# Patient Record
Sex: FEMALE | Race: WHITE | NOT HISPANIC OR LATINO | Employment: FULL TIME | URBAN - METROPOLITAN AREA
[De-identification: names, ages, dates, MRNs, and addresses within clinical notes are randomized per-mention and may not be internally consistent; named-entity substitution may affect disease eponyms.]

---

## 2023-10-27 DIAGNOSIS — E66.3 OVERWEIGHT WITH BODY MASS INDEX (BMI) OF 26 TO 26.9 IN ADULT: Primary | ICD-10-CM

## 2023-11-30 RX ORDER — NALTREXONE HYDROCHLORIDE AND BUPROPION HYDROCHLORIDE 8; 90 MG/1; MG/1
2 TABLET, EXTENDED RELEASE ORAL 2 TIMES DAILY
Qty: 120 TABLET | Refills: 2 | OUTPATIENT
Start: 2023-11-30

## 2024-02-16 DIAGNOSIS — F41.9 ANXIETY DISORDER, UNSPECIFIED: ICD-10-CM

## 2024-02-16 RX ORDER — BUSPIRONE HYDROCHLORIDE 15 MG/1
15 TABLET ORAL 2 TIMES DAILY
COMMUNITY
Start: 2021-09-01

## 2024-02-16 RX ORDER — ESCITALOPRAM OXALATE 20 MG/1
20 TABLET ORAL DAILY
Qty: 30 TABLET | Refills: 0 | Status: SHIPPED | OUTPATIENT
Start: 2024-02-16 | End: 2024-03-25

## 2024-02-16 RX ORDER — ESCITALOPRAM OXALATE 20 MG/1
20 TABLET ORAL DAILY
COMMUNITY

## 2024-03-21 DIAGNOSIS — F41.9 ANXIETY DISORDER, UNSPECIFIED: ICD-10-CM

## 2024-03-21 RX ORDER — ESCITALOPRAM OXALATE 20 MG/1
20 TABLET ORAL DAILY
Qty: 90 TABLET | Refills: 1 | OUTPATIENT
Start: 2024-03-21

## 2024-03-25 DIAGNOSIS — F41.9 ANXIETY DISORDER, UNSPECIFIED: ICD-10-CM

## 2024-03-25 RX ORDER — ESCITALOPRAM OXALATE 20 MG/1
20 TABLET ORAL DAILY
Qty: 25 TABLET | Refills: 0 | Status: SHIPPED | OUTPATIENT
Start: 2024-03-25

## 2024-09-16 ENCOUNTER — LAB (OUTPATIENT)
Dept: LAB | Facility: LAB | Age: 54
End: 2024-09-16
Payer: COMMERCIAL

## 2024-09-16 ENCOUNTER — APPOINTMENT (OUTPATIENT)
Dept: PRIMARY CARE | Facility: CLINIC | Age: 54
End: 2024-09-16
Payer: COMMERCIAL

## 2024-09-16 VITALS
HEART RATE: 83 BPM | WEIGHT: 145.69 LBS | HEIGHT: 64 IN | DIASTOLIC BLOOD PRESSURE: 71 MMHG | TEMPERATURE: 98 F | BODY MASS INDEX: 24.87 KG/M2 | SYSTOLIC BLOOD PRESSURE: 108 MMHG

## 2024-09-16 DIAGNOSIS — Z13.1 SCREENING FOR DIABETES MELLITUS: ICD-10-CM

## 2024-09-16 DIAGNOSIS — Z13.220 LIPID SCREENING: ICD-10-CM

## 2024-09-16 DIAGNOSIS — Z00.00 ROUTINE GENERAL MEDICAL EXAMINATION AT A HEALTH CARE FACILITY: Primary | ICD-10-CM

## 2024-09-16 DIAGNOSIS — Z13.29 SCREENING FOR THYROID DISORDER: ICD-10-CM

## 2024-09-16 DIAGNOSIS — F41.9 ANXIETY DISORDER, UNSPECIFIED: ICD-10-CM

## 2024-09-16 DIAGNOSIS — E55.9 VITAMIN D DEFICIENCY: ICD-10-CM

## 2024-09-16 DIAGNOSIS — Z12.31 SCREENING MAMMOGRAM FOR BREAST CANCER: ICD-10-CM

## 2024-09-16 LAB
25(OH)D3 SERPL-MCNC: 28 NG/ML (ref 30–100)
ALBUMIN SERPL BCP-MCNC: 4.7 G/DL (ref 3.4–5)
ALP SERPL-CCNC: 40 U/L (ref 33–110)
ALT SERPL W P-5'-P-CCNC: 12 U/L (ref 7–45)
ANION GAP SERPL CALC-SCNC: 10 MMOL/L (ref 10–20)
AST SERPL W P-5'-P-CCNC: 17 U/L (ref 9–39)
BILIRUB SERPL-MCNC: 0.7 MG/DL (ref 0–1.2)
BUN SERPL-MCNC: 13 MG/DL (ref 6–23)
CALCIUM SERPL-MCNC: 9.7 MG/DL (ref 8.6–10.3)
CHLORIDE SERPL-SCNC: 105 MMOL/L (ref 98–107)
CHOLEST SERPL-MCNC: 256 MG/DL (ref 0–199)
CHOLESTEROL/HDL RATIO: 2.8
CO2 SERPL-SCNC: 28 MMOL/L (ref 21–32)
CREAT SERPL-MCNC: 0.73 MG/DL (ref 0.5–1.05)
EGFRCR SERPLBLD CKD-EPI 2021: >90 ML/MIN/1.73M*2
GLUCOSE SERPL-MCNC: 97 MG/DL (ref 74–99)
HDLC SERPL-MCNC: 89.9 MG/DL
LDLC SERPL CALC-MCNC: 152 MG/DL
NON HDL CHOLESTEROL: 166 MG/DL (ref 0–149)
POTASSIUM SERPL-SCNC: 5.4 MMOL/L (ref 3.5–5.3)
PROT SERPL-MCNC: 7.3 G/DL (ref 6.4–8.2)
SODIUM SERPL-SCNC: 138 MMOL/L (ref 136–145)
TRIGL SERPL-MCNC: 70 MG/DL (ref 0–149)
TSH SERPL-ACNC: 1.26 MIU/L (ref 0.44–3.98)
VLDL: 14 MG/DL (ref 0–40)

## 2024-09-16 PROCEDURE — 90471 IMMUNIZATION ADMIN: CPT | Performed by: FAMILY MEDICINE

## 2024-09-16 PROCEDURE — 84443 ASSAY THYROID STIM HORMONE: CPT

## 2024-09-16 PROCEDURE — 3008F BODY MASS INDEX DOCD: CPT | Performed by: FAMILY MEDICINE

## 2024-09-16 PROCEDURE — 80053 COMPREHEN METABOLIC PANEL: CPT

## 2024-09-16 PROCEDURE — 1036F TOBACCO NON-USER: CPT | Performed by: FAMILY MEDICINE

## 2024-09-16 PROCEDURE — 99396 PREV VISIT EST AGE 40-64: CPT | Performed by: FAMILY MEDICINE

## 2024-09-16 PROCEDURE — 36415 COLL VENOUS BLD VENIPUNCTURE: CPT

## 2024-09-16 PROCEDURE — 82306 VITAMIN D 25 HYDROXY: CPT

## 2024-09-16 PROCEDURE — 80061 LIPID PANEL: CPT

## 2024-09-16 PROCEDURE — 90750 HZV VACC RECOMBINANT IM: CPT | Performed by: FAMILY MEDICINE

## 2024-09-16 RX ORDER — BUPROPION HYDROCHLORIDE 150 MG/1
TABLET ORAL
COMMUNITY
End: 2024-09-16 | Stop reason: WASHOUT

## 2024-09-16 RX ORDER — ESCITALOPRAM OXALATE 20 MG/1
20 TABLET ORAL DAILY
Qty: 90 TABLET | Refills: 3 | Status: SHIPPED | OUTPATIENT
Start: 2024-09-16 | End: 2025-09-16

## 2024-09-16 NOTE — PROGRESS NOTES
"    /71   Pulse 83   Temp 36.7 °C (98 °F)   Ht 1.619 m (5' 3.75\")   Wt 66.1 kg (145 lb 11 oz)   BMI 25.20 kg/m²     Past Medical History:   Diagnosis Date    Encounter for examination of ears and hearing without abnormal findings 01/27/2017    Encounter for audiology evaluation    Encounter for gynecological examination (general) (routine) without abnormal findings 11/03/2020    Well woman exam    Other specified health status     No pertinent past medical history    Other specified noninflammatory disorders of vagina 11/03/2020    Vaginal odor    Personal history of other diseases of the respiratory system 11/02/2021    History of acute pharyngitis    Pulsatile tinnitus, left ear 07/18/2017    Pulsatile tinnitus of left ear    Tinnitus, left ear 01/27/2017    Subjective tinnitus of left ear       There is no problem list on file for this patient.      Current Outpatient Medications   Medication Sig Dispense Refill    escitalopram (Lexapro) 20 mg tablet TAKE 1 TABLET BY MOUTH EVERY DAY 25 tablet 0    buPROPion XL (Wellbutrin XL) 150 mg 24 hr tablet       busPIRone (Buspar) 15 mg tablet Take 1 tablet (15 mg) by mouth 2 times a day.      escitalopram (Lexapro) 20 mg tablet Take 1 tablet (20 mg) by mouth once daily.       No current facility-administered medications for this visit.       CC/HPI/ASSESSMENT/PLAN    CC annual wellness visit    HPI patient 54-year-old female here for a wellness visit.  No cognitive deficits noted.  She is up-to-date with colonoscopy.  Immunizations reviewed, declines flu shots.  She will need second shingle vaccine otherwise up-to-date with vaccines.  She is due for mammography and blood work as well.  Currently using Lexapro with good results.  Requesting refills.  Patient denies headache fever chest pain palpitation short of breath abdominal pain diarrhea blood in urine or stool.  ROS negative except noted above.  Past medical social surgical history is reviewed    Exam: Vital " stable eyes no jaundice ears clear mouth moist throat clear neck supple no LAD no goiter no carotid bruit.  Lungs CTA good air exchange CV RRR abdomen soft nontender back straight no scoliosis Ext full ROM all extremities no edema or cyanosis.  Skin no rash neuro alert oriented no focal neurologic deficits noted no cognitive deficits noted.  Psych: Pleasant female no psychosis    A/P 1.  Annual wellness visit.  No cognitive deficits noted.  Colonoscopy up-to-date.  Declines flu shot, received second shingle vaccine otherwise up-to-date with vaccines.  Mammogram ordered.  Blood work is ordered.  Lexapro refilled.  Healthy diet regular exercise discussed.  Follow-up 1 year or sooner as needed    There are no diagnoses linked to this encounter.

## 2024-09-16 NOTE — RESULT ENCOUNTER NOTE
Blood work reviewed.  Vitamin D slightly low add 2000 IU vitamin D3 daily.  Liver kidney sugar levels look good.  Cholesterol increased significantly now to 56.  Watch the diet.  No medicines advised at this time.

## 2024-09-23 ENCOUNTER — TELEPHONE (OUTPATIENT)
Dept: PRIMARY CARE | Facility: CLINIC | Age: 54
End: 2024-09-23
Payer: COMMERCIAL

## 2024-09-23 NOTE — TELEPHONE ENCOUNTER
----- Message from Santiago Hidalgo sent at 9/16/2024  3:09 PM EDT -----  Blood work reviewed.  Vitamin D slightly low add 2000 IU vitamin D3 daily.  Liver kidney sugar levels look good.  Cholesterol increased significantly now to 56.  Watch the diet.  No medicines advised at this time.

## 2024-10-17 DIAGNOSIS — F41.9 ANXIETY DISORDER, UNSPECIFIED: ICD-10-CM

## 2024-10-17 RX ORDER — ESCITALOPRAM OXALATE 20 MG/1
20 TABLET ORAL DAILY
Qty: 90 TABLET | Refills: 3 | Status: SHIPPED | OUTPATIENT
Start: 2024-10-17 | End: 2025-10-17

## 2024-10-30 ENCOUNTER — HOSPITAL ENCOUNTER (OUTPATIENT)
Dept: RADIOLOGY | Facility: HOSPITAL | Age: 54
Discharge: HOME | End: 2024-10-30
Payer: COMMERCIAL

## 2024-10-30 DIAGNOSIS — Z12.31 SCREENING MAMMOGRAM FOR BREAST CANCER: ICD-10-CM

## 2024-10-30 PROCEDURE — 77063 BREAST TOMOSYNTHESIS BI: CPT | Performed by: RADIOLOGY

## 2024-10-30 PROCEDURE — 77067 SCR MAMMO BI INCL CAD: CPT

## 2024-10-30 PROCEDURE — 77067 SCR MAMMO BI INCL CAD: CPT | Performed by: RADIOLOGY

## 2024-11-06 ENCOUNTER — TELEPHONE (OUTPATIENT)
Dept: PRIMARY CARE | Facility: CLINIC | Age: 54
End: 2024-11-06
Payer: COMMERCIAL

## 2024-11-06 DIAGNOSIS — R92.8 ABNORMAL MAMMOGRAM: Primary | ICD-10-CM

## 2024-11-06 NOTE — TELEPHONE ENCOUNTER
----- Message from Santiago Hidalgo sent at 11/6/2024 12:26 PM EST -----  Radiology recommending diagnostic mammogram and ultrasound left breast as ordered

## 2024-12-09 ENCOUNTER — HOSPITAL ENCOUNTER (OUTPATIENT)
Dept: RADIOLOGY | Facility: HOSPITAL | Age: 54
Discharge: HOME | End: 2024-12-09
Payer: COMMERCIAL

## 2024-12-09 DIAGNOSIS — R92.8 ABNORMAL MAMMOGRAM: ICD-10-CM

## 2024-12-09 PROCEDURE — 77065 DX MAMMO INCL CAD UNI: CPT | Mod: LT

## 2024-12-09 PROCEDURE — 76642 ULTRASOUND BREAST LIMITED: CPT | Mod: LEFT SIDE | Performed by: RADIOLOGY

## 2024-12-09 PROCEDURE — 77061 BREAST TOMOSYNTHESIS UNI: CPT | Mod: LEFT SIDE | Performed by: RADIOLOGY

## 2024-12-09 PROCEDURE — 76982 USE 1ST TARGET LESION: CPT | Mod: LT

## 2024-12-09 PROCEDURE — 77065 DX MAMMO INCL CAD UNI: CPT | Mod: LEFT SIDE | Performed by: RADIOLOGY

## 2024-12-09 PROCEDURE — 76642 ULTRASOUND BREAST LIMITED: CPT | Mod: LT

## 2025-01-03 ENCOUNTER — APPOINTMENT (OUTPATIENT)
Dept: SURGICAL ONCOLOGY | Facility: HOSPITAL | Age: 55
End: 2025-01-03
Payer: COMMERCIAL

## 2025-01-03 ENCOUNTER — APPOINTMENT (OUTPATIENT)
Dept: RADIOLOGY | Facility: HOSPITAL | Age: 55
End: 2025-01-03
Payer: COMMERCIAL

## 2025-01-09 ENCOUNTER — APPOINTMENT (OUTPATIENT)
Dept: SURGICAL ONCOLOGY | Facility: HOSPITAL | Age: 55
End: 2025-01-09
Payer: COMMERCIAL

## 2025-05-01 ENCOUNTER — OFFICE VISIT (OUTPATIENT)
Dept: ORTHOPEDIC SURGERY | Facility: CLINIC | Age: 55
End: 2025-05-01
Payer: COMMERCIAL

## 2025-05-01 ENCOUNTER — HOSPITAL ENCOUNTER (OUTPATIENT)
Dept: RADIOLOGY | Facility: CLINIC | Age: 55
Discharge: HOME | End: 2025-05-01
Payer: COMMERCIAL

## 2025-05-01 DIAGNOSIS — M25.511 ACUTE PAIN OF RIGHT SHOULDER: ICD-10-CM

## 2025-05-01 DIAGNOSIS — M75.01 ADHESIVE CAPSULITIS OF RIGHT SHOULDER: Primary | ICD-10-CM

## 2025-05-01 PROCEDURE — 1036F TOBACCO NON-USER: CPT | Performed by: ORTHOPAEDIC SURGERY

## 2025-05-01 PROCEDURE — 20610 DRAIN/INJ JOINT/BURSA W/O US: CPT | Performed by: ORTHOPAEDIC SURGERY

## 2025-05-01 PROCEDURE — 99204 OFFICE O/P NEW MOD 45 MIN: CPT | Performed by: ORTHOPAEDIC SURGERY

## 2025-05-01 PROCEDURE — 73030 X-RAY EXAM OF SHOULDER: CPT | Mod: RT

## 2025-05-01 RX ORDER — LIDOCAINE HYDROCHLORIDE 10 MG/ML
2 INJECTION, SOLUTION INFILTRATION; PERINEURAL
Status: COMPLETED | OUTPATIENT
Start: 2025-05-01 | End: 2025-05-01

## 2025-05-01 RX ORDER — TRIAMCINOLONE ACETONIDE 40 MG/ML
40 INJECTION, SUSPENSION INTRA-ARTICULAR; INTRAMUSCULAR
Status: COMPLETED | OUTPATIENT
Start: 2025-05-01 | End: 2025-05-01

## 2025-05-01 RX ADMIN — TRIAMCINOLONE ACETONIDE 40 MG: 40 INJECTION, SUSPENSION INTRA-ARTICULAR; INTRAMUSCULAR at 14:12

## 2025-05-01 RX ADMIN — LIDOCAINE HYDROCHLORIDE 2 ML: 10 INJECTION, SOLUTION INFILTRATION; PERINEURAL at 14:12

## 2025-05-01 NOTE — PROGRESS NOTES
History of Present Illness:   Patient presents today with right shoulder pain.  The  patient notes the insidious onset of pain and denies recent or historical trauma.  There is persistent pain and difficulty with motion.  The pain is diffuse, achy, worse at the extremes of motion.  There is pain at night.  No active cervical spine issues, numbness or tingling.     She is very physically active outside of work, has a desk job.  She was seeing a physical therapist for the shoulder pain that had referred her to Dr. Miller and team.  Denies any numbness, tingling, loss sensation to the right upper extremity.    Medical History[1]  Surgical History[2]  Current Medications[3]    Review of Systems   GENERAL: Negative for malaise, significant weight loss, fever  MUSCULOSKELETAL: see HPI  NEURO:  Negative    Physical Examination:  Skin healthy and intact  Negative Spurlings test  Range of motion:  Forward flexion: Full  External rotation: 90  Internal rotation: Lumbar spine, contralateral side T-spine  No gross weakness with strength testing   Normal nuerovascular exam distally     Imaging:  No obvious fractures or significant degenerative changes noted    Assessment:  Patient with adhesive capsulitis     Plan:  We had a lengthy discussion regarding adhesive capsulitis including the causes (idiopathic vs secondary in nature).  We stressed the importance of extensive physical therapy and strict adherence to a home exercise program.  We highlighted the excellent probability for success with non-surgical treatment but the protracted course of recovery.   We reviewed the role of anti-inflammatories (oral and injectable) and the potential for manipulation and/or arthroscopy if failure to improve.    Elvin Gil PA-C      In a face to face encounter, I evaluated the patient and performed a physical examination, discussed pertinent diagnostic studies if indicated and discussed diagnosis and management strategies with both the  patient and physician assistant / nurse practitioner.  I reviewed the PA/NP's note and agree with the documented findings and plan of care.    Patient with evidence of a mild adhesive capsulitis.  We discussed this may be primary or secondary.  Discussed formal therapy and corticosteroid injection.    Does have positive impingement test as well as speeds test, MRI if she fails to improve    L Inj/Asp: R subacromial bursa on 5/1/2025 2:12 PM  Indications: pain  Details: 22 G needle, posterior approach  Medications: 2 mL lidocaine 10 mg/mL (1 %); 40 mg triamcinolone acetonide 40 mg/mL  Outcome: tolerated well, no immediate complications  Procedure, treatment alternatives, risks and benefits explained, specific risks discussed. Consent was given by the patient. Immediately prior to procedure a time out was called to verify the correct patient, procedure, equipment, support staff and site/side marked as required. Patient was prepped and draped in the usual sterile fashion.             Chad Miller MD                   [1]   Past Medical History:  Diagnosis Date    Encounter for examination of ears and hearing without abnormal findings 01/27/2017    Encounter for audiology evaluation    Encounter for gynecological examination (general) (routine) without abnormal findings 11/03/2020    Well woman exam    Other specified health status     No pertinent past medical history    Other specified noninflammatory disorders of vagina 11/03/2020    Vaginal odor    Personal history of other diseases of the respiratory system 11/02/2021    History of acute pharyngitis    Pulsatile tinnitus, left ear 07/18/2017    Pulsatile tinnitus of left ear    Tinnitus, left ear 01/27/2017    Subjective tinnitus of left ear   [2]   Past Surgical History:  Procedure Laterality Date    BREAST LUMPECTOMY  12/23/2016    Breast Surgery Lumpectomy    MR HEAD ANGIO WO IV CONTRAST  7/31/2017    MR HEAD ANGIO WO IV CONTRAST 7/31/2017 [facility] CLINICAL LEGACY     MR NECK ANGIO WO IV CONTRAST  7/31/2017    MR NECK ANGIO WO IV CONTRAST 7/31/2017 [facility] CLINICAL LEGACY   [3]   Current Outpatient Medications:     escitalopram (Lexapro) 20 mg tablet, Take 1 tablet (20 mg) by mouth once daily., Disp: 90 tablet, Rfl: 3

## 2025-05-27 ENCOUNTER — OFFICE VISIT (OUTPATIENT)
Dept: URGENT CARE | Age: 55
End: 2025-05-27
Payer: COMMERCIAL

## 2025-05-27 VITALS
TEMPERATURE: 97.5 F | RESPIRATION RATE: 16 BRPM | SYSTOLIC BLOOD PRESSURE: 135 MMHG | HEART RATE: 61 BPM | OXYGEN SATURATION: 99 % | DIASTOLIC BLOOD PRESSURE: 77 MMHG

## 2025-05-27 DIAGNOSIS — R09.81 NASAL CONGESTION: ICD-10-CM

## 2025-05-27 DIAGNOSIS — R49.1 LOSS OF VOICE: ICD-10-CM

## 2025-05-27 DIAGNOSIS — U07.1 COVID-19: Primary | ICD-10-CM

## 2025-05-27 LAB
POC CORONAVIRUS SARS-COV-2 PCR: POSITIVE
POC HUMAN RHINOVIRUS PCR: NEGATIVE
POC INFLUENZA A VIRUS PCR: NEGATIVE
POC INFLUENZA B VIRUS PCR: NEGATIVE
POC RESPIRATORY SYNCYTIAL VIRUS PCR: NEGATIVE

## 2025-05-27 PROCEDURE — 99203 OFFICE O/P NEW LOW 30 MIN: CPT | Performed by: STUDENT IN AN ORGANIZED HEALTH CARE EDUCATION/TRAINING PROGRAM

## 2025-05-27 PROCEDURE — 87631 RESP VIRUS 3-5 TARGETS: CPT | Performed by: STUDENT IN AN ORGANIZED HEALTH CARE EDUCATION/TRAINING PROGRAM

## 2025-05-27 NOTE — PROGRESS NOTES
Subjective   Patient ID: Anastacia Fonseca is a 55 y.o. female. They present today with a chief complaint of Nasal Congestion, Laryngitis, and dry throat (4 days).    History of Present Illness  Anastacia is a very pleasant 55-year-old female who presents to the urgent care for evaluation of 4 days of sore throat with now losing voice and some nasal congestion.  Patient notes a slight cough with throat irritation.  Patient denies chest pain, shortness of breath or leg swelling or leg pain.  Patient is seeking evaluation reassurance that she has been around family recently who are elderly and have other chronic medical conditions and would like evaluation, testing and reassurance.  No other symptoms or concerns otherwise reported.    Past Medical History  Allergies as of 05/27/2025    (No Known Allergies)       Prescriptions Prior to Admission[1]     Medical History[2]    Surgical History[3]     reports that she has never smoked. She has never used smokeless tobacco.    Review of Systems  A 10-point review of systems was performed, otherwise unremarkable unless stated in the history of present illness.              Objective    Vitals:    05/27/25 1642   BP: 135/77   Pulse: 61   Resp: 16   Temp: 36.4 °C (97.5 °F)   SpO2: 99%     No LMP recorded. Patient is postmenopausal.    Gen: Vitals noted and reviewed, no evidence of acute distress, well developed and afebrile.   Psych: Mood and affect appropriate for setting.  Head/Face: Atraumatic and normocephalic.   Neuro: No focal deficits noted.  ENT: TMs clear bilaterally, EACs unremarkable. Mastoids non-tender. Posterior oropharynx without erythema, exudate, or swelling. Uvula is in the midline and non-edematous.   Neck: Supple. No meningismus through full range of motion. No lymphadenopathy.   Cardiac: Regular rate and rhythm no murmur.   Lungs: Clear to auscultation throughout, No evidence of wheezing, rhonchi or crackles. Good aeration throughout.   Extremities: Symmetrical, No  peripheral edema  Skin: Without evidence of ecchymosis, wounds, or rashes.      Point of Care Test & Imaging Results from this visit  Results for orders placed or performed in visit on 05/27/25   POCT SPOTFIRE R/ST Panel Mini w/COVID ([facility]) manually resulted    Specimen: Swab   Result Value Ref Range    POC Sars-Cov-2 PCR Positive (A) Negative    POC Respiratory Syncytial Virus PCR Negative Negative    POC Influenza A Virus PCR Negative Negative    POC Influenza B Virus PCR Negative Negative    POC Human Rhinovirus PCR Negative Negative      Imaging  No results found.    Cardiology, Vascular, and Other Imaging  No other imaging results found for the past 2 days      Diagnostic study results (if any) were reviewed by Kesha Torres DO.    Assessment/Plan   Allergies, medications, history, and pertinent labs/EKGs/Imaging reviewed by Kesha Torres DO.     Medical Decision Making  Discussed with the patient symptoms and clinical presentation findings suggestive of an acute viral upper respiratory illness likely secondary COVID-19 infection with possible developing laryngitis secondary to this.  We advise close symptom monitoring supportive treatment use of over-the-counter remedies to aid in symptom management.  We reviewed contact precautions and recommended supportive treatment measures and reviewed red flag symptoms of COVID-19 infection and advised seek immediate emergent medical attention if these develop.  In the meantime recommended supportive care and rest and we offered a work excuse note which the patient deferred at this time. Follow up with Primary Care Provider. We advised seeking immediate emergency medical attention if symptoms fail to improve, worsen or any concerning symptoms arise. Patient voiced full understanding and agreement to plan.      Orders and Diagnoses  Diagnoses and all orders for this visit:  COVID-19  Nasal congestion  -     POCT SPOTFIRE R/ST Panel Mini w/COVID ([facility]) manually  resulted  Loss of voice  -     POCT SPOTFIRE R/ST Panel Mini w/COVID ([facility]) manually resulted      Medical Admin Record      Patient disposition: Home    Electronically signed by Kesha Torres DO  5:11 PM           [1] (Not in a hospital admission)   [2]   Past Medical History:  Diagnosis Date    Encounter for examination of ears and hearing without abnormal findings 01/27/2017    Encounter for audiology evaluation    Encounter for gynecological examination (general) (routine) without abnormal findings 11/03/2020    Well woman exam    Other specified health status     No pertinent past medical history    Other specified noninflammatory disorders of vagina 11/03/2020    Vaginal odor    Personal history of other diseases of the respiratory system 11/02/2021    History of acute pharyngitis    Pulsatile tinnitus, left ear 07/18/2017    Pulsatile tinnitus of left ear    Tinnitus, left ear 01/27/2017    Subjective tinnitus of left ear   [3]   Past Surgical History:  Procedure Laterality Date    BREAST LUMPECTOMY  12/23/2016    Breast Surgery Lumpectomy    MR HEAD ANGIO WO IV CONTRAST  7/31/2017    MR HEAD ANGIO WO IV CONTRAST 7/31/2017 [facility] CLINICAL LEGACY    MR NECK ANGIO WO IV CONTRAST  7/31/2017    MR NECK ANGIO WO IV CONTRAST 7/31/2017 [facility] CLINICAL LEGACY

## 2025-06-11 ENCOUNTER — OFFICE VISIT (OUTPATIENT)
Dept: URGENT CARE | Age: 55
End: 2025-06-11
Payer: COMMERCIAL

## 2025-06-11 VITALS
OXYGEN SATURATION: 98 % | HEART RATE: 62 BPM | WEIGHT: 145 LBS | RESPIRATION RATE: 18 BRPM | BODY MASS INDEX: 25.08 KG/M2 | TEMPERATURE: 97.6 F | DIASTOLIC BLOOD PRESSURE: 77 MMHG | SYSTOLIC BLOOD PRESSURE: 150 MMHG

## 2025-06-11 DIAGNOSIS — U07.1 COVID-19: Primary | ICD-10-CM

## 2025-06-11 DIAGNOSIS — J04.0 LARYNGITIS: ICD-10-CM

## 2025-06-11 DIAGNOSIS — R68.89 FLU-LIKE SYMPTOMS: ICD-10-CM

## 2025-06-11 PROCEDURE — 87631 RESP VIRUS 3-5 TARGETS: CPT | Performed by: STUDENT IN AN ORGANIZED HEALTH CARE EDUCATION/TRAINING PROGRAM

## 2025-06-11 PROCEDURE — 99213 OFFICE O/P EST LOW 20 MIN: CPT | Performed by: STUDENT IN AN ORGANIZED HEALTH CARE EDUCATION/TRAINING PROGRAM

## 2025-06-11 RX ORDER — PREDNISONE 20 MG/1
20 TABLET ORAL DAILY
Qty: 5 TABLET | Refills: 0 | Status: SHIPPED | OUTPATIENT
Start: 2025-06-11 | End: 2025-06-16

## 2025-06-11 NOTE — PATIENT INSTRUCTIONS
Follow up with Primary Care Provider. We advised seeking immediate emergency medical attention if symptoms fail to improve, worsen or any concerning symptoms arise. Patient voiced full understanding and agreement to plan.    We discussed with the patient our clinical thoughts at this time given the above findings and clinical assessment and we had a shared decision-making conversation in a patient-centered decision-making model on how to proceed forward. The patient was instructed on the importance of a close follow-up with Primary Care Provider and other care providers. The patient was also advised that an Urgent care diagnosis is often a preliminary impression and that definitive care is often not able to be given completley in the Urgent care setting.

## 2025-06-11 NOTE — PROGRESS NOTES
Subjective   Patient ID: Anastacia Fonseca is a 55 y.o. female. They present today with a chief complaint of Nasal Congestion and Cough.    History of Present Illness  Anastacia is a pleasant established 55-year-old female who presents to the urgent care for follow-up of upper respiratory symptoms with previous diagnosis of COVID-19 infection on 5/27/2025.  The patient states [place] [facility] and started feeling worse shortly after without associated chest pain or shortness of breath.  Patient's main concern is losing voice and is seeking evaluation reassurance and treatment options.  Patient is also requesting additional testing to rule out other etiologies of symptoms as she has an advance high risk aged parent who she has to  this week and will like to limit exposure if needed.  Patient denies any leg or calf pain or calf swelling.  No other symptoms or concerns otherwise reported.    Past Medical History  Allergies as of 06/11/2025    (No Known Allergies)       Prescriptions Prior to Admission[1]     Medical History[2]    Surgical History[3]     reports that she has never smoked. She has never used smokeless tobacco.    Review of Systems  A 10-point review of systems was performed, otherwise unremarkable unless stated in the history of present illness.              Objective    Vitals:    06/11/25 1616   BP: 150/77   Pulse: 62   Resp: 18   Temp: 36.4 °C (97.6 °F)   SpO2: 98%   Weight: 65.8 kg (145 lb)     No LMP recorded. Patient is postmenopausal.    Gen: Vitals noted and reviewed, no evidence of acute distress, well developed and afebrile.   Psych: Mood and affect appropriate for setting.  Head/Face: Atraumatic and normocephalic.   Neuro: No focal deficits noted.  ENT: TMs clear bilaterally, EACs unremarkable. Mastoids non-tender. Posterior oropharynx without erythema, exudate, or swelling. Uvula is in the midline and non-edematous.   Neck: Supple. No meningismus through full range of motion. No lymphadenopathy.    Cardiac: Regular rate and rhythm no murmur.   Lungs: Clear to auscultation throughout, No evidence of wheezing, rhonchi or crackles. Good aeration throughout.   Extremities: Symmetrical, No peripheral edema. Negative homans sign bilaterally   Skin: Without evidence of ecchymosis, wounds, or rashes.      Point of Care Test & Imaging Results from this visit  Results for orders placed or performed in visit on 06/11/25   POCT SPOTFIRE R/ST [email or web address] manually resulted    Specimen: Swab   Result Value Ref Range    POC Sars-Cov-2 PCR Positive (A) Negative    POC Respiratory Syncytial Virus PCR Negative Negative    POC Influenza A Virus PCR Negative Negative    POC Influenza B Virus PCR Negative Negative    POC Human Rhinovirus PCR Negative Negative      Imaging  No results found.    Cardiology, Vascular, and Other Imaging  No other imaging results found for the past 2 days      Diagnostic study results (if any) were reviewed by Kesha Torres DO.    Assessment/Plan   Allergies, medications, history, and pertinent labs/EKGs/Imaging reviewed by Kesha Torres DO.     Medical Decision Making  Discussed with the patient symptoms and clinical presentation findings suggestive of likely acute laryngitis in the setting of active COVID 19 infection as detected on PCR testing with previous diagnosis of COVID-19 2 weeks ago.  We advise close symptom monitoring supportive treatment use of over-the-counter remedies to aid in symptom management.  In the meantime we agreed to prescribe a short course of prednisone to aid in symptom relief and laryngitis symptoms. We reviewed contact precautions and recommended supportive treatment measures and reviewed red flag symptoms of COVID-19 infection and advised seek immediate emergent medical attention if these develop.  In the meantime recommended supportive care and rest and we offered a work excuse note which the patient deferred at this time. Follow up with Primary Care  Provider. We advised seeking immediate emergency medical attention if symptoms fail to improve, worsen or any concerning symptoms arise. Patient voiced full understanding and agreement to plan.     We discussed with the patient our clinical thoughts at this time given the above findings and clinical assessment and we had a shared decision-making conversation in a patient-centered decision-making model on how to proceed forward. The patient was instructed on the importance of a close follow-up with Primary Care Provider and other care providers. The patient was also advised that an Urgent care diagnosis is often a preliminary impression and that definitive care is often not able to be given completley in the Urgent care setting.      Orders and Diagnoses  Diagnoses and all orders for this visit:  COVID-19  Flu-like symptoms  -     POCT SPOTFIRE R/ST Panel Mini w/COVID ([facility]) manually resulted  Laryngitis  -     predniSONE (Deltasone) 20 mg tablet; Take 1 tablet (20 mg) by mouth once daily for 5 days.      Medical Admin Record      Patient disposition: Home    Electronically signed by Kesha Torres DO  5:39 PM           [1] (Not in a hospital admission)   [2]   Past Medical History:  Diagnosis Date    Encounter for examination of ears and hearing without abnormal findings 01/27/2017    Encounter for audiology evaluation    Encounter for gynecological examination (general) (routine) without abnormal findings 11/03/2020    Well woman exam    Other specified health status     No pertinent past medical history    Other specified noninflammatory disorders of vagina 11/03/2020    Vaginal odor    Personal history of other diseases of the respiratory system 11/02/2021    History of acute pharyngitis    Pulsatile tinnitus, left ear 07/18/2017    Pulsatile tinnitus of left ear    Tinnitus, left ear 01/27/2017    Subjective tinnitus of left ear   [3]   Past Surgical History:  Procedure Laterality Date    BREAST LUMPECTOMY   12/23/2016    Breast Surgery Lumpectomy    MR HEAD ANGIO WO IV CONTRAST  7/31/2017    MR HEAD ANGIO WO IV CONTRAST 7/31/2017 [facility] CLINICAL LEGACY    MR NECK ANGIO WO IV CONTRAST  7/31/2017    MR NECK ANGIO WO IV CONTRAST 7/31/2017 [facility] CLINICAL LEGACY

## 2025-06-12 ENCOUNTER — TELEPHONE (OUTPATIENT)
Dept: PRIMARY CARE | Facility: CLINIC | Age: 55
End: 2025-06-12
Payer: COMMERCIAL

## 2025-06-12 NOTE — TELEPHONE ENCOUNTER
Pt states that she went is at Urgent Care and she had covid and still testing positive. She had laryngitis and they will not give her antibiotics. She stated that she only has steroids. She would like to know if she would need antibiotics.

## 2025-06-12 NOTE — TELEPHONE ENCOUNTER
Generally COVID does not require antibiotic treatment.  If not getting better should be seen in office

## 2025-08-15 ENCOUNTER — APPOINTMENT (OUTPATIENT)
Dept: PRIMARY CARE | Facility: CLINIC | Age: 55
End: 2025-08-15
Payer: COMMERCIAL

## 2025-08-15 VITALS
DIASTOLIC BLOOD PRESSURE: 80 MMHG | WEIGHT: 146 LBS | SYSTOLIC BLOOD PRESSURE: 110 MMHG | HEIGHT: 64 IN | HEART RATE: 73 BPM | BODY MASS INDEX: 24.92 KG/M2

## 2025-08-15 DIAGNOSIS — R53.83 FATIGUE, UNSPECIFIED TYPE: Primary | ICD-10-CM

## 2025-08-15 PROCEDURE — 3008F BODY MASS INDEX DOCD: CPT | Performed by: FAMILY MEDICINE

## 2025-08-15 PROCEDURE — 1036F TOBACCO NON-USER: CPT | Performed by: FAMILY MEDICINE

## 2025-08-15 PROCEDURE — 99213 OFFICE O/P EST LOW 20 MIN: CPT | Performed by: FAMILY MEDICINE

## 2025-08-15 RX ORDER — CETIRIZINE HYDROCHLORIDE 10 MG/1
10 TABLET ORAL DAILY
COMMUNITY

## 2025-08-15 RX ORDER — TRAZODONE HYDROCHLORIDE 50 MG/1
50 TABLET ORAL NIGHTLY PRN
Qty: 30 TABLET | Refills: 2 | Status: SHIPPED | OUTPATIENT
Start: 2025-08-15 | End: 2025-11-13

## 2025-08-15 ASSESSMENT — ENCOUNTER SYMPTOMS
HEADACHES: 0
SHORTNESS OF BREATH: 0
DIZZINESS: 0
FEVER: 0
ACTIVITY CHANGE: 0
FATIGUE: 0

## 2025-08-15 ASSESSMENT — PATIENT HEALTH QUESTIONNAIRE - PHQ9
1. LITTLE INTEREST OR PLEASURE IN DOING THINGS: NOT AT ALL
2. FEELING DOWN, DEPRESSED OR HOPELESS: NOT AT ALL
SUM OF ALL RESPONSES TO PHQ9 QUESTIONS 1 AND 2: 0

## 2025-10-13 ENCOUNTER — APPOINTMENT (OUTPATIENT)
Dept: PRIMARY CARE | Facility: CLINIC | Age: 55
End: 2025-10-13
Payer: COMMERCIAL